# Patient Record
Sex: MALE | Race: WHITE | NOT HISPANIC OR LATINO | URBAN - METROPOLITAN AREA
[De-identification: names, ages, dates, MRNs, and addresses within clinical notes are randomized per-mention and may not be internally consistent; named-entity substitution may affect disease eponyms.]

---

## 2022-08-16 ENCOUNTER — TELEPHONE (OUTPATIENT)
Dept: LAB | Facility: CLINIC | Age: 32
End: 2022-08-16

## 2022-08-16 NOTE — PROGRESS NOTES
Called patient and left voicemail for patient to call [facility] concerning his upcoming lab appt on 8-21-22. There are no lab orders in [facility]. Is he bringing lab orders with him from an Outside provider? Please forward him to [facility] so staff can speak with him.    Thank you, NELSON SandersT   regular rate and rhythm/no murmur

## 2022-08-21 ENCOUNTER — APPOINTMENT (OUTPATIENT)
Dept: LAB | Facility: CLINIC | Age: 32
End: 2022-08-21
Payer: COMMERCIAL

## 2022-08-22 ENCOUNTER — TELEPHONE (OUTPATIENT)
Dept: OBGYN | Facility: CLINIC | Age: 32
End: 2022-08-22

## 2022-08-22 DIAGNOSIS — D59.10 HEMOLYTIC ANEMIA DUE TO ANTIBODY (H): Primary | ICD-10-CM

## 2022-08-22 NOTE — TELEPHONE ENCOUNTER
Pt asking for Dr. Qureshi to place [facility] orders for testing, pt's wife is currently pregnant. Pt does not have a PCP.    RN routing to provider to advise on orders.

## 2022-08-23 ENCOUNTER — TELEPHONE (OUTPATIENT)
Dept: OBGYN | Facility: CLINIC | Age: 32
End: 2022-08-23

## 2022-08-29 ENCOUNTER — LAB (OUTPATIENT)
Dept: LAB | Facility: CLINIC | Age: 32
End: 2022-08-29
Payer: COMMERCIAL

## 2022-08-29 DIAGNOSIS — D59.10 HEMOLYTIC ANEMIA DUE TO ANTIBODY (H): ICD-10-CM

## 2022-08-29 LAB
K AG RBC QL: NEGATIVE
SPECIMEN EXPIRATION DATE: NORMAL

## 2022-08-29 PROCEDURE — 36415 COLL VENOUS BLD VENIPUNCTURE: CPT

## 2022-08-29 PROCEDURE — 86905 BLOOD TYPING RBC ANTIGENS: CPT

## 2022-10-03 ENCOUNTER — HEALTH MAINTENANCE LETTER (OUTPATIENT)
Age: 32
End: 2022-10-03

## 2023-10-22 ENCOUNTER — HEALTH MAINTENANCE LETTER (OUTPATIENT)
Age: 33
End: 2023-10-22

## 2024-12-15 ENCOUNTER — HEALTH MAINTENANCE LETTER (OUTPATIENT)
Age: 34
End: 2024-12-15